# Patient Record
Sex: MALE | Employment: FULL TIME | URBAN - METROPOLITAN AREA
[De-identification: names, ages, dates, MRNs, and addresses within clinical notes are randomized per-mention and may not be internally consistent; named-entity substitution may affect disease eponyms.]

---

## 2024-07-11 ENCOUNTER — OFFICE VISIT (OUTPATIENT)
Dept: GASTROENTEROLOGY | Facility: CLINIC | Age: 69
End: 2024-07-11
Payer: COMMERCIAL

## 2024-07-11 VITALS
SYSTOLIC BLOOD PRESSURE: 116 MMHG | DIASTOLIC BLOOD PRESSURE: 78 MMHG | BODY MASS INDEX: 28.36 KG/M2 | HEIGHT: 73 IN | WEIGHT: 214 LBS

## 2024-07-11 DIAGNOSIS — R93.5 ABNORMAL MRI OF ABDOMEN: Primary | ICD-10-CM

## 2024-07-11 DIAGNOSIS — R10.9 ABDOMINAL CRAMPING: ICD-10-CM

## 2024-07-11 PROCEDURE — 99204 OFFICE O/P NEW MOD 45 MIN: CPT | Performed by: STUDENT IN AN ORGANIZED HEALTH CARE EDUCATION/TRAINING PROGRAM

## 2024-07-11 RX ORDER — DICYCLOMINE HCL 20 MG
20 TABLET ORAL EVERY 6 HOURS
Qty: 120 TABLET | Refills: 1 | Status: SHIPPED | OUTPATIENT
Start: 2024-07-11 | End: 2024-09-09

## 2024-07-11 RX ORDER — PANTOPRAZOLE SODIUM 20 MG/1
1 TABLET, DELAYED RELEASE ORAL
COMMUNITY

## 2024-07-11 RX ORDER — TESTOSTERONE CYPIONATE 200 MG/ML
INJECTION, SOLUTION INTRAMUSCULAR
COMMUNITY

## 2024-07-11 RX ORDER — LISINOPRIL 10 MG/1
1 TABLET ORAL
COMMUNITY

## 2024-07-11 RX ORDER — TERBINAFINE HYDROCHLORIDE 250 MG/1
250 TABLET ORAL DAILY
COMMUNITY
Start: 2024-05-09

## 2024-07-11 RX ORDER — SERTRALINE HYDROCHLORIDE 100 MG/1
2 TABLET, FILM COATED ORAL DAILY
COMMUNITY

## 2024-07-11 RX ORDER — OMEGA-3-ACID ETHYL ESTERS 1 G/1
2 CAPSULE, LIQUID FILLED ORAL 2 TIMES DAILY
COMMUNITY
Start: 2024-06-28

## 2024-07-11 RX ORDER — LOVASTATIN 20 MG/1
20 TABLET ORAL DAILY
COMMUNITY
Start: 2024-03-22

## 2024-07-11 RX ORDER — ROSUVASTATIN CALCIUM 20 MG/1
TABLET, COATED ORAL
COMMUNITY

## 2024-07-11 RX ORDER — DEXTROAMPHETAMINE SACCHARATE, AMPHETAMINE ASPARTATE MONOHYDRATE, DEXTROAMPHETAMINE SULFATE AND AMPHETAMINE SULFATE 7.5; 7.5; 7.5; 7.5 MG/1; MG/1; MG/1; MG/1
2 CAPSULE, EXTENDED RELEASE ORAL DAILY
COMMUNITY

## 2024-07-11 RX ORDER — TAMSULOSIN HYDROCHLORIDE 0.4 MG/1
0.4 CAPSULE ORAL DAILY
COMMUNITY
Start: 2024-03-29

## 2024-07-11 NOTE — PATIENT INSTRUCTIONS
We can check a blood test and stool test to measure for inflammation in your colon.  If this is abnormal we can talk about further testing.  Otherwise we can monitor your symptoms.  You can take Bentyl as needed up to 4 times a day.

## 2024-07-11 NOTE — PROGRESS NOTES
Consultation - [facility]     Rich Senior 68 y.o. male MRN: [id number]  Unit/Bed#:  Encounter: [id number]    Consults    ASSESSMENT and PLAN    1. Abnormal MRI of abdomen  Mucosal hyperenhancement of the sigmoid colon, either proctocolitis or artifact given collapsed nature of colon at time of imaging.  He reports history of abdominal cramping and constipation for which he takes laxatives on a regular basis.  Recommend checking fecal calprotectin and C-reactive protein and if abnormal could consider repeat colonoscopy/flex sig for biopsies.  Will start Bentyl as needed for cramping.  Follow-up 3 months.  - Calprotectin,Fecal; Future  - C-reactive protein; Future  - Calprotectin,Fecal  - C-reactive protein    2. Abdominal cramping  Start Bentyl as needed for abdominal cramping, follow-up studies for inflammation and reassess symptoms in 3 months.  Depending on workup could consider further testing such as flex sig/colonoscopy.  - dicyclomine (BENTYL) 20 mg tablet; Take 1 tablet (20 mg total) by mouth every 6 (six) hours  Dispense: 120 tablet; Refill: 1      Chief Complaint   Patient presents with    GI Consult     Pt had MRI at [facility], he was told he had colitis and collapsed colon. Would like discuss results. PCP recommended GI consult.       KIP Johnson is a 68-year-old male with recent diagnosed prostate cancer who presents for abnormal findings on MRI, possible proctocolitis. Questionable hx UC on history in our chart, no further data and not reported by the patient.  The MRI was done to evaluate his prostate and had incidental finding of a mostly collapsed rectosigmoid colon with mucosal hyperenhancement, uncertain whether this represented colitis or proctitis/colitis.  He reports baseline constipation and takes a laxative regularly.  He also has some cramping and bloating at times.  He had a colonoscopy 4 years ago at [place] which was reportedly normal.  He is not  "having any blood in his stool.    GI History:  Prior Colonoscopy: Self-reports colonoscopy 4 years ago [facility] normal  Prior EGD: Reports EGD 4 years ago, mild gastritis  Family hx:  No reported first degree relatives with colorectal cancer  Surgical hx: No prior abdominal surgeries  Blood thinners: Denies antiplatelet or anticoagulation use  NSAID use: Denies regular NSAID use  DM meds: None  Social Hx: No regular ETOH, smoking, or drug use.          Historical Information   Past Medical History:   Diagnosis Date    Cancer (HCC)     Prostate cancer    Hernia     Double hernia surgery    Hypertension     Irritable bowel syndrome     Ulcerative colitis (HCC)      Past Surgical History:   Procedure Laterality Date    COLONOSCOPY  2020    HERNIA REPAIR       Social History   Social History     Substance and Sexual Activity   Alcohol Use Not Currently     Social History     Substance and Sexual Activity   Drug Use Not Currently     Social History     Tobacco Use   Smoking Status Former    Types: Cigarettes   Smokeless Tobacco Never     Family History   Problem Relation Age of Onset    Arthritis Mother     Hearing loss Mother     Hypertension Mother     Vision loss Mother     Early death Father         Heart attack at 56    Heart disease Father     Mental illness Father     Colon cancer Neg Hx        Meds/Allergies     No current facility-administered medications for this visit.     Not in a hospital admission.    Allergies   Allergen Reactions    Iodinated Contrast Media Other (See Comments)    Latex Other (See Comments)       PHYSICAL EXAM    Visit Vitals  /78   Ht 6' 1\" (1.854 m)   Wt 97.1 kg (214 lb)   BMI 28.23 kg/m²   Smoking Status Former   BSA 2.21 m²     Body mass index is 28.23 kg/m².  General Appearance: NAD, cooperative, alert  Eyes: Anicteric, EOMI  ENT: Normocephalic, atraumatic, normal mucosa  Neck: symmetrical, trachea midline  Lungs: clear to auscultation, non-labored respirations on room " "air, no wheezing  CV: S1 S2+ radial pulses bilaterally  GI:  Soft, non-tender, non-distended; normal bowel sounds; no masses, no organomegaly   Rectal: Deferred  Musculoskeletal: No gross deformities or pitting edema  Skin:  No jaundice, no rashes  Neurologic: awake, alert, oriented, no gross deficits    No results found for: \"GLUCOSE\", \"CALCIUM\", \"NA\", \"K\", \"CO2\", \"CL\", \"BUN\", \"CREATININE\"  No results found for: \"WBC\", \"HGB\", \"MCV\", \"PLT\"  No results found for: \"ALT\", \"AST\", \"GGT\", \"ALKPHOS\", \"TBILI\"  No results found for: \"AMYLASE\"  No results found for: \"LIPASE\"  No results found for: \"IRON\", \"TIBC\", \"FERRITIN\"  No results found for: \"INR\"    No results found.    Imaging Studies: I have personally reviewed pertinent reports.      Pathology, and Other Studies: I have personally reviewed pertinent reports.      REVIEW OF SYSTEMS    CONSTITUTIONAL: negative unless stated in HPI  GASTROINTESTINAL: As noted in the HPI        "

## 2024-07-26 LAB — CRP SERPL-MCNC: <1 MG/L (ref 0–10)

## 2024-07-31 LAB — CALPROTECTIN STL-MCNT: 11 UG/G (ref 0–120)

## 2024-08-11 DIAGNOSIS — R10.9 ABDOMINAL CRAMPING: ICD-10-CM

## 2024-08-11 RX ORDER — DICYCLOMINE HCL 20 MG
20 TABLET ORAL EVERY 6 HOURS
Qty: 120 TABLET | Refills: 1 | Status: SHIPPED | OUTPATIENT
Start: 2024-08-11

## 2024-09-01 DIAGNOSIS — R10.9 ABDOMINAL CRAMPING: ICD-10-CM

## 2024-09-02 RX ORDER — DICYCLOMINE HCL 20 MG
20 TABLET ORAL EVERY 6 HOURS
Qty: 120 TABLET | Refills: 1 | Status: SHIPPED | OUTPATIENT
Start: 2024-09-02